# Patient Record
(demographics unavailable — no encounter records)

---

## 2017-07-26 PROCEDURE — 86663 EPSTEIN-BARR ANTIBODY: CPT | Performed by: PEDIATRICS

## 2017-07-26 PROCEDURE — 36415 COLL VENOUS BLD VENIPUNCTURE: CPT | Performed by: PEDIATRICS

## 2017-07-26 PROCEDURE — 86664 EPSTEIN-BARR NUCLEAR ANTIGEN: CPT | Performed by: PEDIATRICS

## 2017-07-26 PROCEDURE — 86665 EPSTEIN-BARR CAPSID VCA: CPT | Performed by: PEDIATRICS

## 2020-03-09 NOTE — ED INITIAL ASSESSMENT (HPI)
Patient presents to ED with complaints of lower left abdominal pain, diarrhea, vomiting, decrease in appetite, and fever x2-3 days.

## 2020-03-09 NOTE — ED PROVIDER NOTES
Patient Seen in: [facility] Emergency Department      History   Patient presents with:  Abdomen/Flank Pain  Nausea/Vomiting/Diarrhea    Stated Complaint: fever, abdominal pain, N/V/D    HPI    Eloy Barton is a 35-year-old who presents for evaluation of v SpO2 97%   BMI 36.59 kg/m²         Physical Exam  General: Well appearing child in no acute distress. HEENT: Atraumatic, normocephalic. Pupils equally round and reactive to light. Extra ocular movements are intact and full.   Tympanic membranes are clear ------                     CBC W/ DIFFERENTIAL[[id number]]          Abnormal            Final result                 Please view results for these tests on the individual orders.    RAINBOW DRAW BLUE   RAINBOW DRAW LAVENDER   RAINBOW DRAW LIGHT GREEN   RAINB

## (undated) NOTE — ED AVS SNAPSHOT
Maicol Thompson   MRN: [id number]    Department:  [facility] Emergency Department   Date of Visit:  3/8/2020           Disclosure     Insurance plans vary and the physician(s) referred by the ER may not be covered by your plan.  Please contact your tell this physician (or your personal doctor if your instructions are to return to your personal doctor) about any new or lasting problems. The primary care or specialist physician will see patients referred from the [facility] Emergency Department.  Peter Hartman

## (undated) NOTE — LETTER
Date & Time: 3/9/2020, 12:36 AM  Patient: Lillie Guzman  Encounter Provider(s):    Dylan Tian MD       To Whom It May Concern:    Lavonne Vivas was seen and treated in our department on 3/8/2020.  He may return to school on 3/10/2020  If you have any q